# Patient Record
Sex: FEMALE | Race: BLACK OR AFRICAN AMERICAN | Employment: OTHER | URBAN - METROPOLITAN AREA
[De-identification: names, ages, dates, MRNs, and addresses within clinical notes are randomized per-mention and may not be internally consistent; named-entity substitution may affect disease eponyms.]

---

## 2017-08-18 ENCOUNTER — HOSPITAL ENCOUNTER (OUTPATIENT)
Dept: MAMMOGRAPHY | Age: 72
Discharge: HOME | End: 2017-08-18
Payer: MEDICARE

## 2017-08-18 DIAGNOSIS — Z12.31 VISIT FOR SCREENING MAMMOGRAM: ICD-10-CM

## 2017-08-18 PROCEDURE — 77066 DX MAMMO INCL CAD BI: CPT

## 2017-08-18 PROCEDURE — 77062 BREAST TOMOSYNTHESIS BI: CPT

## 2018-08-20 ENCOUNTER — HOSPITAL ENCOUNTER (OUTPATIENT)
Dept: MAMMOGRAPHY | Age: 73
Discharge: HOME | End: 2018-08-20
Attending: FAMILY MEDICINE
Payer: MEDICARE

## 2018-08-20 DIAGNOSIS — Z12.31 VISIT FOR SCREENING MAMMOGRAM: ICD-10-CM

## 2018-08-20 PROCEDURE — 77067 SCR MAMMO BI INCL CAD: CPT

## 2018-10-18 ENCOUNTER — DOCUMENTATION ONLY (OUTPATIENT)
Dept: DIABETES SERVICES | Age: 73
End: 2018-10-18

## 2018-10-18 NOTE — PROGRESS NOTES
[facility] Diabetes Education Class    Patient attended outpatient [employer] [facility] Diabetes Education Classes. Dates and class topics attended are noted below. Location: [facility]    Date Attended Class/Topic    Class 1: Understanding Diabetes  Strategies to staying healthy with diabetes were covered including prevention and treatment of acute and chronic complications of diabetes, principles of self-monitoring of blood glucose, target blood glucose and A1C levels and sick day management. 10/11/2018 Class 2: Nutrition and Diabetes  Principles of meal planning with diabetes were covered including carbohydrate counting, label reading, portion control, recipes and where needed, weight control. A personalized calorie consistent carbohydrate meal plan and reference books were provided. Class 3: Diabetes Medications and Foot Care  This class was facilitated by a staff pharmacist and included information on diabetes medications (oral and subcutaneous). In addition, interactions of common over the counter and prescription medications with diabetes management were reviewed. Principles of good foot care were reviewed, including proper foot inspections, how to select appropriate shoes and socks and ways to keep the feet healthy. Class 4: Exercise and Wrap-Up of Nutrition  Principles of good foot care were reviewed, including proper foot inspections, how to select appropriate shoes and socks and ways to keep the feet healthy. Discussed exercise strategies that can help with blood glucose control. Additional nutrition related topics were discussed. Please contact me if you have questions.     [address] [place] [address],      [facility], [address] [place] [address],  [email or web address]

## 2019-08-22 ENCOUNTER — HOSPITAL ENCOUNTER (OUTPATIENT)
Dept: MAMMOGRAPHY | Age: 74
Discharge: HOME | End: 2019-08-22
Attending: FAMILY MEDICINE
Payer: MEDICARE

## 2019-08-22 DIAGNOSIS — Z12.39 BREAST SCREENING, UNSPECIFIED: ICD-10-CM

## 2019-08-22 PROCEDURE — 77067 SCR MAMMO BI INCL CAD: CPT

## 2020-10-10 ENCOUNTER — HOSPITAL ENCOUNTER (OUTPATIENT)
Dept: MAMMOGRAPHY | Age: 75
Discharge: HOME | End: 2020-10-10
Attending: INTERNAL MEDICINE
Payer: MEDICARE

## 2020-10-10 DIAGNOSIS — Z12.31 VISIT FOR SCREENING MAMMOGRAM: ICD-10-CM

## 2020-10-10 PROCEDURE — 77063 BREAST TOMOSYNTHESIS BI: CPT

## 2021-09-29 ENCOUNTER — TRANSCRIBE ORDER (OUTPATIENT)
Dept: SCHEDULING | Age: 76
End: 2021-09-29

## 2021-09-29 DIAGNOSIS — Z12.31 SCREENING MAMMOGRAM FOR HIGH-RISK PATIENT: Primary | ICD-10-CM

## 2021-10-16 ENCOUNTER — HOSPITAL ENCOUNTER (OUTPATIENT)
Dept: MAMMOGRAPHY | Age: 76
Discharge: HOME | End: 2021-10-16
Attending: INTERNAL MEDICINE
Payer: MEDICARE

## 2021-10-16 DIAGNOSIS — Z12.31 SCREENING MAMMOGRAM FOR HIGH-RISK PATIENT: ICD-10-CM

## 2021-10-16 PROCEDURE — 77067 SCR MAMMO BI INCL CAD: CPT

## 2022-09-07 ENCOUNTER — TRANSCRIBE ORDER (OUTPATIENT)
Dept: SCHEDULING | Age: 77
End: 2022-09-07

## 2022-09-07 DIAGNOSIS — I73.9 PERIPHERAL VASCULAR DISEASE, UNSPECIFIED (HCC): Primary | ICD-10-CM

## 2022-09-13 ENCOUNTER — HOSPITAL ENCOUNTER (OUTPATIENT)
Dept: VASCULAR SURGERY | Age: 77
Discharge: HOME | End: 2022-09-13
Attending: INTERNAL MEDICINE
Payer: MEDICARE

## 2022-09-13 DIAGNOSIS — I73.9 PERIPHERAL VASCULAR DISEASE, UNSPECIFIED (HCC): ICD-10-CM

## 2022-09-13 LAB
LEFT ABI: 1.13
LEFT ARM BP: 151 MMHG
LEFT POSTERIOR TIBIAL: 153 MMHG
LEFT TBI: 0.97
LEFT TOE PRESSURE: 146 MMHG
RIGHT ABI: 1.13
RIGHT ARM BP: 140 MMHG
RIGHT POSTERIOR TIBIAL: 148 MMHG
VAS LEFT DORSALIS PEDIS BP: 170 MMHG
VAS RIGHT DORSALIS PEDIS BP: 171 MMHG

## 2022-09-13 PROCEDURE — 93922 UPR/L XTREMITY ART 2 LEVELS: CPT

## 2022-10-03 ENCOUNTER — TRANSCRIBE ORDER (OUTPATIENT)
Dept: SCHEDULING | Age: 77
End: 2022-10-03

## 2022-10-03 DIAGNOSIS — Z12.31 VISIT FOR SCREENING MAMMOGRAM: Primary | ICD-10-CM

## 2022-10-29 ENCOUNTER — HOSPITAL ENCOUNTER (OUTPATIENT)
Dept: MAMMOGRAPHY | Age: 77
Discharge: HOME | End: 2022-10-29
Attending: INTERNAL MEDICINE
Payer: MEDICARE

## 2022-10-29 DIAGNOSIS — Z12.31 VISIT FOR SCREENING MAMMOGRAM: ICD-10-CM

## 2022-10-29 PROCEDURE — 77067 SCR MAMMO BI INCL CAD: CPT

## 2023-04-27 ENCOUNTER — TRANSCRIBE ORDERS (OUTPATIENT)
Facility: HOSPITAL | Age: 78
End: 2023-04-27

## 2023-04-27 DIAGNOSIS — R07.9 CHEST PAIN ON EXERTION: Primary | ICD-10-CM

## 2023-05-05 ENCOUNTER — HOSPITAL ENCOUNTER (OUTPATIENT)
Facility: HOSPITAL | Age: 78
Discharge: HOME | End: 2023-05-05
Payer: MEDICARE

## 2023-05-05 ENCOUNTER — HOSPITAL ENCOUNTER (OUTPATIENT)
Facility: HOSPITAL | Age: 78
End: 2023-05-05
Payer: MEDICARE

## 2023-05-05 VITALS
DIASTOLIC BLOOD PRESSURE: 82 MMHG | HEIGHT: 62 IN | HEART RATE: 51 BPM | SYSTOLIC BLOOD PRESSURE: 171 MMHG | BODY MASS INDEX: 34.04 KG/M2 | WEIGHT: 185 LBS

## 2023-05-05 DIAGNOSIS — R07.9 CHEST PAIN ON EXERTION: ICD-10-CM

## 2023-05-05 LAB
ECHO BSA: 1.92 M2
EKG DIAGNOSIS: NORMAL
NUC STRESS EJECTION FRACTION: 47 %
STRESS BASELINE DIAS BP: 82 MMHG
STRESS BASELINE HR: 51 BPM
STRESS BASELINE SYS BP: 171 MMHG
STRESS ESTIMATED WORKLOAD: 1 METS
STRESS EXERCISE DUR MIN: 4 MIN
STRESS EXERCISE DUR SEC: 0 SEC
STRESS PEAK DIAS BP: 81 MMHG
STRESS PEAK SYS BP: 173 MMHG
STRESS PERCENT HR ACHIEVED: 59 %
STRESS POST PEAK HR: 86 BPM
STRESS RATE PRESSURE PRODUCT: NORMAL BPM*MMHG
STRESS TARGET HR: 145 BPM
TID: 1.08

## 2023-05-05 PROCEDURE — 93017 CV STRESS TEST TRACING ONLY: CPT

## 2023-05-05 PROCEDURE — 6360000002 HC RX W HCPCS

## 2023-05-05 PROCEDURE — 3430000000 HC RX DIAGNOSTIC RADIOPHARMACEUTICAL

## 2023-05-05 PROCEDURE — 2580000003 HC RX 258

## 2023-05-05 PROCEDURE — A9502 TC99M TETROFOSMIN: HCPCS

## 2023-05-05 RX ORDER — SODIUM CHLORIDE 9 MG/ML
INJECTION, SOLUTION INTRAVENOUS ONCE
Status: COMPLETED | OUTPATIENT
Start: 2023-05-05 | End: 2023-05-05

## 2023-05-05 RX ADMIN — TETROFOSMIN 10.4 MILLICURIE: 1.38 INJECTION, POWDER, LYOPHILIZED, FOR SOLUTION INTRAVENOUS at 10:00

## 2023-05-05 RX ADMIN — TETROFOSMIN 33 MILLICURIE: 1.38 INJECTION, POWDER, LYOPHILIZED, FOR SOLUTION INTRAVENOUS at 12:10

## 2023-05-05 RX ADMIN — REGADENOSON 0.4 MG: 0.08 INJECTION, SOLUTION INTRAVENOUS at 12:10

## 2023-05-05 RX ADMIN — SODIUM CHLORIDE: 900 INJECTION, SOLUTION INTRAVENOUS at 12:10

## 2023-10-20 ENCOUNTER — TRANSCRIBE ORDERS (OUTPATIENT)
Facility: HOSPITAL | Age: 78
End: 2023-10-20

## 2023-10-20 DIAGNOSIS — Z12.31 VISIT FOR SCREENING MAMMOGRAM: Primary | ICD-10-CM

## 2023-11-06 ENCOUNTER — HOSPITAL ENCOUNTER (OUTPATIENT)
Facility: HOSPITAL | Age: 78
Discharge: HOME | End: 2023-11-09
Attending: NURSE PRACTITIONER
Payer: MEDICARE

## 2023-11-06 VITALS — WEIGHT: 181 LBS | BODY MASS INDEX: 33.31 KG/M2 | HEIGHT: 62 IN

## 2023-11-06 DIAGNOSIS — Z12.31 VISIT FOR SCREENING MAMMOGRAM: ICD-10-CM

## 2023-11-06 PROCEDURE — 77067 SCR MAMMO BI INCL CAD: CPT

## 2024-09-10 ENCOUNTER — TRANSCRIBE ORDERS (OUTPATIENT)
Facility: HOSPITAL | Age: 79
End: 2024-09-10

## 2024-09-10 DIAGNOSIS — Z12.31 VISIT FOR SCREENING MAMMOGRAM: Primary | ICD-10-CM

## 2024-09-10 DIAGNOSIS — Z78.0 POSTMENOPAUSAL: Primary | ICD-10-CM

## 2024-11-05 ENCOUNTER — HOSPITAL ENCOUNTER (OUTPATIENT)
Facility: HOSPITAL | Age: 79
Discharge: HOME | End: 2024-11-08
Payer: MEDICARE

## 2024-11-05 VITALS — HEIGHT: 60 IN | WEIGHT: 143 LBS | BODY MASS INDEX: 28.07 KG/M2

## 2024-11-05 DIAGNOSIS — Z78.0 POSTMENOPAUSAL: ICD-10-CM

## 2024-11-05 DIAGNOSIS — Z12.31 VISIT FOR SCREENING MAMMOGRAM: ICD-10-CM

## 2024-11-05 PROCEDURE — 77080 DXA BONE DENSITY AXIAL: CPT

## 2024-11-05 PROCEDURE — 77063 BREAST TOMOSYNTHESIS BI: CPT

## 2025-08-12 ENCOUNTER — TRANSCRIBE ORDERS (OUTPATIENT)
Facility: HOSPITAL | Age: 80
End: 2025-08-12

## 2025-08-12 DIAGNOSIS — Z78.0 POSTMENOPAUSAL STATE: ICD-10-CM

## 2025-08-12 DIAGNOSIS — Z12.39 ENCOUNTER FOR SCREENING FOR MALIGNANT NEOPLASM OF BREAST, UNSPECIFIED SCREENING MODALITY: ICD-10-CM

## 2025-08-12 DIAGNOSIS — Z12.31 VISIT FOR SCREENING MAMMOGRAM: Primary | ICD-10-CM
